# Patient Record
Sex: FEMALE | URBAN - METROPOLITAN AREA
[De-identification: names, ages, dates, MRNs, and addresses within clinical notes are randomized per-mention and may not be internally consistent; named-entity substitution may affect disease eponyms.]

---

## 2024-03-22 ENCOUNTER — APPOINTMENT (OUTPATIENT)
Dept: URBAN - METROPOLITAN AREA CLINIC 302 | Age: 34
Setting detail: DERMATOLOGY
End: 2024-03-22

## 2024-03-22 DIAGNOSIS — Z41.9 ENCOUNTER FOR PROCEDURE FOR PURPOSES OTHER THAN REMEDYING HEALTH STATE, UNSPECIFIED: ICD-10-CM

## 2024-03-22 PROCEDURE — OTHER WAXING: OTHER

## 2024-03-22 PROCEDURE — OTHER FACIAL: OTHER

## 2024-03-22 ASSESSMENT — LOCATION ZONE DERM: LOCATION ZONE: FACE

## 2024-03-22 ASSESSMENT — LOCATION SIMPLE DESCRIPTION DERM
LOCATION SIMPLE: LEFT EYEBROW
LOCATION SIMPLE: LEFT CHEEK

## 2024-03-22 ASSESSMENT — LOCATION DETAILED DESCRIPTION DERM
LOCATION DETAILED: LEFT MEDIAL MALAR CHEEK
LOCATION DETAILED: LEFT CENTRAL EYEBROW

## 2024-03-22 NOTE — PROCEDURE: FACIAL
Detail Level: Zone
Treatment Type (Optional): Sensitive
Price (Use Numbers Only, No Special Characters Or $): 130
Extraction Method: extractor
Facial Steaming: steamed

## 2024-03-22 NOTE — PROCEDURE: WAXING
Price (Use Numbers Only, No Special Characters Or $): 20
Techique: The unwanted hair in the treatment area(s) were removed using wax.
Detail Level: Zone

## 2024-04-24 ENCOUNTER — APPOINTMENT (OUTPATIENT)
Dept: URBAN - METROPOLITAN AREA CLINIC 302 | Age: 34
Setting detail: DERMATOLOGY
End: 2024-04-24

## 2024-04-24 DIAGNOSIS — Z41.9 ENCOUNTER FOR PROCEDURE FOR PURPOSES OTHER THAN REMEDYING HEALTH STATE, UNSPECIFIED: ICD-10-CM

## 2024-04-24 PROCEDURE — OTHER FACIAL: OTHER

## 2024-04-24 ASSESSMENT — LOCATION DETAILED DESCRIPTION DERM: LOCATION DETAILED: LEFT MEDIAL MALAR CHEEK

## 2024-04-24 ASSESSMENT — LOCATION ZONE DERM: LOCATION ZONE: FACE

## 2024-04-24 ASSESSMENT — LOCATION SIMPLE DESCRIPTION DERM: LOCATION SIMPLE: LEFT CHEEK

## 2024-04-24 NOTE — PROCEDURE: FACIAL
Detail Level: Zone
Mask Type (Optional): purifying
Extraction Method: extractor
Price (Use Numbers Only, No Special Characters Or $): 140
Treatment Type (Optional): Sensitive
Exfoliation Type: enzyme
Facial Steaming: steamed

## 2024-06-03 ENCOUNTER — APPOINTMENT (OUTPATIENT)
Dept: URBAN - METROPOLITAN AREA CLINIC 302 | Age: 34
Setting detail: DERMATOLOGY
End: 2024-06-03

## 2024-06-03 DIAGNOSIS — Z41.9 ENCOUNTER FOR PROCEDURE FOR PURPOSES OTHER THAN REMEDYING HEALTH STATE, UNSPECIFIED: ICD-10-CM

## 2024-06-03 PROCEDURE — OTHER FACIAL: OTHER

## 2024-06-03 ASSESSMENT — LOCATION DETAILED DESCRIPTION DERM: LOCATION DETAILED: RIGHT CENTRAL MALAR CHEEK

## 2024-06-03 ASSESSMENT — LOCATION ZONE DERM: LOCATION ZONE: FACE

## 2024-06-03 ASSESSMENT — LOCATION SIMPLE DESCRIPTION DERM: LOCATION SIMPLE: RIGHT CHEEK

## 2024-06-03 NOTE — PROCEDURE: FACIAL
Price (Use Numbers Only, No Special Characters Or $): 130
Exfoliation Type: enzyme
Facial Steaming: steamed
Mask Type (Optional): purifying
Detail Level: Zone
Treatment Type (Optional): Sensitive

## 2024-08-02 ENCOUNTER — APPOINTMENT (OUTPATIENT)
Dept: URBAN - METROPOLITAN AREA CLINIC 302 | Age: 34
Setting detail: DERMATOLOGY
End: 2024-08-02

## 2024-08-02 DIAGNOSIS — Z41.9 ENCOUNTER FOR PROCEDURE FOR PURPOSES OTHER THAN REMEDYING HEALTH STATE, UNSPECIFIED: ICD-10-CM

## 2024-08-02 PROCEDURE — OTHER FACIAL: OTHER

## 2024-08-02 ASSESSMENT — LOCATION SIMPLE DESCRIPTION DERM: LOCATION SIMPLE: LEFT CHEEK

## 2024-08-02 ASSESSMENT — LOCATION ZONE DERM: LOCATION ZONE: FACE

## 2024-08-02 ASSESSMENT — LOCATION DETAILED DESCRIPTION DERM: LOCATION DETAILED: LEFT INFERIOR MEDIAL MALAR CHEEK

## 2024-08-02 NOTE — PROCEDURE: FACIAL
Facial Steaming: steamed
Treatment Type (Optional): European Facial
Exfoliation Type: enzyme
Extraction Method: comedone extractor
Mask Type (Optional): endy-based
Price (Use Numbers Only, No Special Characters Or $): 104
Detail Level: Zone